# Patient Record
Sex: FEMALE | Race: WHITE | Employment: STUDENT | URBAN - METROPOLITAN AREA
[De-identification: names, ages, dates, MRNs, and addresses within clinical notes are randomized per-mention and may not be internally consistent; named-entity substitution may affect disease eponyms.]

---

## 2021-08-05 ENCOUNTER — OFFICE VISIT (OUTPATIENT)
Dept: PRIMARY CARE CLINIC | Age: 21
End: 2021-08-05
Payer: COMMERCIAL

## 2021-08-05 VITALS
SYSTOLIC BLOOD PRESSURE: 110 MMHG | WEIGHT: 135.4 LBS | BODY MASS INDEX: 22.56 KG/M2 | OXYGEN SATURATION: 99 % | DIASTOLIC BLOOD PRESSURE: 67 MMHG | HEIGHT: 65 IN | RESPIRATION RATE: 16 BRPM | HEART RATE: 100 BPM | TEMPERATURE: 98.4 F

## 2021-08-05 DIAGNOSIS — F41.9 ANXIETY: Primary | ICD-10-CM

## 2021-08-05 DIAGNOSIS — J45.990 EXERCISE-INDUCED ASTHMA: ICD-10-CM

## 2021-08-05 PROCEDURE — 99203 OFFICE O/P NEW LOW 30 MIN: CPT | Performed by: INTERNAL MEDICINE

## 2021-08-05 RX ORDER — NORETHINDRONE ACETATE AND ETHINYL ESTRADIOL 1MG-20(21)
KIT ORAL
COMMUNITY

## 2021-08-05 NOTE — PROGRESS NOTES
Pau Gill is a 21 y.o.  female and presents with     Chief Complaint   Patient presents with    New Patient    Anxiety     Emotional Support Animal Letters     Pt is here to establish care. Pt has symptoms of anxiety. Pt does not want to take meds. Pt starts overthinking. Pt starts having trouble breathing almost like a panic attack. Pt says the dog helps her relax. She wants to avoid meds to prevent having side effects and complications. Pt lives by herself in her apartment. Pt is currently at [facility]. Danyelle Scott wants a letter to let her keep the dog in the apartment. Cousin has anxiety. NO history of thyroid problems  Pt used to see Pediatrician and has exercise induced asthma          No past medical history on file. No past surgical history on file. Current Outpatient Medications   Medication Sig    norethindrone-ethinyl estradiol (Junel FE 1/20, 28,) 1 mg-20 mcg (21)/75 mg (7) tab Take  by mouth. No current facility-administered medications for this visit. Health Maintenance   Topic Date Due    Hepatitis C Screening  Never done    DTaP/Tdap/Td series (1 - Tdap) Never done    HPV Age 9Y-34Y (1 - 2-dose series) Never done    Flu Vaccine (1) 09/01/2021    COVID-19 Vaccine  Completed    Hepatitis A Peds Age 1-18  Aged Out    Pneumococcal 0-64 years  Aged [employer] History   Administered Date(s) Administered    COVID-19, PFIZER, MRNA, LNP-S, PF, 30MCG/0.3ML DOSE 05/15/2021, 06/05/2021     No LMP recorded. Allergies and Intolerances: Allergies   Allergen Reactions    Peanut Anaphylaxis, Hives and Swelling    Tree Nut Anaphylaxis, Hives and Swelling       Family History:   Family History   Problem Relation Age of Onset    Hypertension Father     Diabetes Maternal Aunt     Cancer Paternal Grandmother        Social History:   She  reports that she has never smoked.  She has never used smokeless tobacco.  She  reports no history of alcohol use.            Review of Systems:   General: negative for - chills, fatigue, fever, weight change  Psych: negative for - anxiety, depression, irritability or mood swings  ENT: negative for - headaches, hearing change, nasal congestion, oral lesions, sneezing or sore throat  Heme/ Lymph: negative for - bleeding problems, bruising, pallor or swollen lymph nodes  Endo: negative for - hot flashes, polydipsia/polyuria or temperature intolerance  Resp: negative for - cough, shortness of breath or wheezing  CV: negative for - chest pain, edema or palpitations  GI: negative for - abdominal pain, change in bowel habits, constipation, diarrhea or nausea/vomiting  : negative for - dysuria, hematuria, incontinence, pelvic pain or vulvar/vaginal symptoms  MSK: negative for - joint pain, joint swelling or muscle pain  Neuro: negative for - confusion, headaches, seizures or weakness  Derm: negative for - dry skin, hair changes, rash or skin lesion changes          Physical:   Vitals:   Vitals:    08/05/21 1405   BP: 110/67   Pulse: 100   Resp: 16   Temp: 98.4 °F (36.9 °C)   TempSrc: Oral   SpO2: 99%   Weight: 135 lb 6.4 oz (61.4 kg)   Height: 5' 4.57\" (1.64 m)           Exam:   HEENT- atraumatic,normocephalic, awake, oriented, well nourished  Neck - supple,no enlarged lymph nodes, no JVD, no thyromegaly  Chest- CTA, no rhonchi, no crackles  Heart- rrr, no murmurs / gallop/rub  Abdomen- soft,BS+,NT, no hepatosplenomegaly  Ext - no c/c/edema   Neuro- no focal deficits. Power 5/5 all extremities  Skin - warm,dry, no obvious rashes. Review of Data:   LABS:   No results found for: WBC, HGB, HCT, PLT, HGBEXT, HCTEXT, PLTEXT, HGBEXT, HCTEXT, PLTEXT  No results found for: NA, K, CL, CO2, GLU, BUN, CREA  No results found for: CHOL, CHOLX, CHLST, CHOLV, HDL, HDLP, LDL, LDLC, DLDLP, TGLX, TRIGL, TRIGP  No components found for: GPT        Impression / Plan:        ICD-10-CM ICD-9-CM    1. Anxiety  F41.9 300.00    2. Exercise-induced asthma  J45.990 493.81      Pt wants to avoid meds for anxiety. She informs that the dog is helping her a lot keeping her calm. Letter for comfort dog given to the pt. Explained to patient risk benefits of the medications. Advised patient to stop meds if having any side effects. Pt verbalized understanding of the instructions. I have discussed the diagnosis with the patient and the intended plan as seen in the above orders. The patient has received an after-visit summary and questions were answered concerning future plans. I have discussed medication side effects and warnings with the patient as well. I have reviewed the plan of care with the patient, accepted their input and they are in agreement with the treatment goals. Reviewed plan of care. Patient has provided input and agrees with goals. Follow-up and Dispositions    · Return if symptoms worsen or fail to improve.          Marielos Richmond MD

## 2021-08-05 NOTE — LETTER
8/5/2021 2:39 PM    Ms. Kay [facility]  [facility] [place] [id number]      To Whom It May Concern:    Kay Mayo is currently under the care of Paulino Ruff. This is to state the above patient has anxiety disorder and would benefit from having a comfort dog. If there are questions or concerns please have the patient contact our office.         Sincerely,      Shyann Modi MD

## 2021-08-05 NOTE — PROGRESS NOTES
Chief Complaint   Patient presents with    New Patient    Anxiety     Emotional Support Animal Letters        Visit Vitals  /67 (BP 1 Location: Right upper arm, BP Patient Position: Sitting)   Pulse 100   Temp 98.4 °F (36.9 °C) (Oral)   Resp 16   Ht 5' 4.57\" (1.64 m)   Wt 135 lb 6.4 oz (61.4 kg)   SpO2 99%   BMI 22.83 kg/m²

## 2022-03-20 PROBLEM — F41.9 ANXIETY: Status: ACTIVE | Noted: 2021-08-05

## 2023-05-15 RX ORDER — NORETHINDRONE ACETATE AND ETHINYL ESTRADIOL 1MG-20(21)
KIT ORAL
COMMUNITY